# Patient Record
Sex: MALE | Race: WHITE | Employment: OTHER | URBAN - METROPOLITAN AREA
[De-identification: names, ages, dates, MRNs, and addresses within clinical notes are randomized per-mention and may not be internally consistent; named-entity substitution may affect disease eponyms.]

---

## 2019-12-02 ENCOUNTER — TELEPHONE (OUTPATIENT)
Dept: OBGYN CLINIC | Facility: CLINIC | Age: 64
End: 2019-12-02

## 2019-12-02 NOTE — TELEPHONE ENCOUNTER
Patient requesting an call back in regard to pain he is currently having states had hernia surgery over 7yrs ago but now pain os back

## 2019-12-02 NOTE — TELEPHONE ENCOUNTER
Pt states he has been having abd pain since last night and is currently in ER at [facility]; pt will be admitted for diverticulitis. Advised pt to call back after D/C to schedule f/u with [address]. msg sent to [address]. Pt verbalized understanding and agrees with POC.

## 2019-12-04 ENCOUNTER — PATIENT OUTREACH (OUTPATIENT)
Dept: FAMILY MEDICINE CLINIC | Facility: CLINIC | Age: 64
End: 2019-12-04

## 2019-12-13 ENCOUNTER — OFFICE VISIT (OUTPATIENT)
Dept: FAMILY MEDICINE CLINIC | Facility: CLINIC | Age: 64
End: 2019-12-13
Payer: COMMERCIAL

## 2019-12-13 VITALS
HEART RATE: 96 BPM | TEMPERATURE: 98 F | BODY MASS INDEX: 28.99 KG/M2 | SYSTOLIC BLOOD PRESSURE: 118 MMHG | HEIGHT: 65 IN | WEIGHT: 174 LBS | OXYGEN SATURATION: 97 % | DIASTOLIC BLOOD PRESSURE: 78 MMHG

## 2019-12-13 DIAGNOSIS — T36.95XA ANTIBIOTIC-ASSOCIATED DIARRHEA: Primary | ICD-10-CM

## 2019-12-13 DIAGNOSIS — R79.89 ELEVATED LFTS: ICD-10-CM

## 2019-12-13 DIAGNOSIS — Z28.21 IMMUNIZATION NOT CARRIED OUT BECAUSE OF PATIENT REFUSAL: ICD-10-CM

## 2019-12-13 DIAGNOSIS — K52.1 ANTIBIOTIC-ASSOCIATED DIARRHEA: Primary | ICD-10-CM

## 2019-12-13 DIAGNOSIS — K57.92 ACUTE DIVERTICULITIS OF INTESTINE: ICD-10-CM

## 2019-12-13 DIAGNOSIS — K21.9 GASTROESOPHAGEAL REFLUX DISEASE WITHOUT ESOPHAGITIS: ICD-10-CM

## 2019-12-13 PROCEDURE — 1111F DSCHRG MED/CURRENT MED MERGE: CPT | Performed by: FAMILY MEDICINE

## 2019-12-13 PROCEDURE — 99214 OFFICE O/P EST MOD 30 MIN: CPT | Performed by: FAMILY MEDICINE

## 2019-12-13 RX ORDER — AMLODIPINE BESYLATE 5 MG/1
5 TABLET ORAL DAILY
COMMUNITY
End: 2019-12-30

## 2019-12-13 NOTE — PROGRESS NOTES
HPI:    Patient ID: Butch Segal is a 59year old male who presents for hospital visit. He was admitted to hospital nOVEMBER 29, 2019 and was discharged December 3, 2019.    Patient was called on December 5, 2019 regarding his illness and was given an file    Relationships      Social connections:        Talks on phone: Not on file        Gets together: Not on file        Attends [employer] service: Not on file        Active member of club or organization: Not on file        Attends meetings of clubs or ASSESSMENT/PLAN:   Antibiotic-associated diarrhea  (primary encounter diagnosis)  Acute diverticulitis of intestine patie expressed understanding of importance to get test Nt is improved but now has diarrhea.   If persistent will check C. difficile toxi

## 2019-12-13 NOTE — PATIENT INSTRUCTIONS
Increase fiber in your diet by 5 g every month. Goal of daily fiber is 30 to 40 g/day. Recommended source of fiber is Metamucil or similar psyllium husk.   Start with 5 g daily increase by 5 g every month      Drink 16 ounces of water with each dose of me

## 2019-12-19 PROBLEM — K21.9 GASTROESOPHAGEAL REFLUX DISEASE WITHOUT ESOPHAGITIS: Status: ACTIVE | Noted: 2019-12-19

## 2019-12-19 PROBLEM — R79.89 ELEVATED LFTS: Status: ACTIVE | Noted: 2019-12-19

## 2019-12-19 PROBLEM — Z28.21 IMMUNIZATION NOT CARRIED OUT BECAUSE OF PATIENT REFUSAL: Status: ACTIVE | Noted: 2019-12-19

## 2019-12-30 ENCOUNTER — TELEPHONE (OUTPATIENT)
Dept: FAMILY MEDICINE CLINIC | Facility: CLINIC | Age: 64
End: 2019-12-30

## 2019-12-30 RX ORDER — LISINOPRIL 20 MG/1
20 TABLET ORAL DAILY
Qty: 90 TABLET | Refills: 3 | Status: SHIPPED | OUTPATIENT
Start: 2019-12-30 | End: 2021-01-19

## 2019-12-30 RX ORDER — AMLODIPINE BESYLATE 5 MG/1
5 TABLET ORAL DAILY
Qty: 90 TABLET | Refills: 3 | Status: SHIPPED | OUTPATIENT
Start: 2019-12-30 | End: 2021-01-26

## 2019-12-30 RX ORDER — OMEPRAZOLE 40 MG/1
40 CAPSULE, DELAYED RELEASE ORAL DAILY
Qty: 90 CAPSULE | Refills: 3 | Status: SHIPPED | OUTPATIENT
Start: 2019-12-30 | End: 2021-01-19

## 2019-12-30 NOTE — TELEPHONE ENCOUNTER
Patient calling to inform has prescription at [facility] needing to be approved by Doctor

## 2019-12-30 NOTE — TELEPHONE ENCOUNTER
Pt requesting 90 day refills sent to [facility]. LOV with [facility] was 12/13/19. Request sent to [facility]. Pt verbalized understanding and agrees with POC. Disp Refills Start End    amLODIPine Besylate 5 MG Oral Tab        Sig - Route:  Take 5 mg by mouth daily

## 2019-12-30 NOTE — TELEPHONE ENCOUNTER
Pt notified that meds were refilled by [address]. Pt verbalized understanding and agrees with POC. Juve Villalobos, DO  You 4 hours ago (12:06 PM)      90 days with 3 refills is good.      [address]

## 2020-02-18 ENCOUNTER — OFFICE VISIT (OUTPATIENT)
Dept: FAMILY MEDICINE CLINIC | Facility: CLINIC | Age: 65
End: 2020-02-18
Payer: MEDICARE

## 2020-02-18 VITALS
WEIGHT: 174 LBS | SYSTOLIC BLOOD PRESSURE: 120 MMHG | TEMPERATURE: 98 F | HEART RATE: 90 BPM | BODY MASS INDEX: 28.99 KG/M2 | OXYGEN SATURATION: 98 % | DIASTOLIC BLOOD PRESSURE: 78 MMHG | HEIGHT: 65 IN

## 2020-02-18 DIAGNOSIS — J01.00 ACUTE MAXILLARY SINUSITIS, RECURRENCE NOT SPECIFIED: ICD-10-CM

## 2020-02-18 DIAGNOSIS — I10 ESSENTIAL HYPERTENSION: ICD-10-CM

## 2020-02-18 DIAGNOSIS — J31.0 CHRONIC RHINITIS: ICD-10-CM

## 2020-02-18 DIAGNOSIS — B35.4 TINEA CORPORIS: Primary | ICD-10-CM

## 2020-02-18 DIAGNOSIS — R05.9 COUGH: ICD-10-CM

## 2020-02-18 PROCEDURE — 69210 REMOVE IMPACTED EAR WAX UNI: CPT | Performed by: FAMILY MEDICINE

## 2020-02-18 PROCEDURE — 99214 OFFICE O/P EST MOD 30 MIN: CPT | Performed by: FAMILY MEDICINE

## 2020-02-18 RX ORDER — FLUTICASONE PROPIONATE 50 MCG
2 SPRAY, SUSPENSION (ML) NASAL DAILY
Qty: 3 BOTTLE | Refills: 3 | Status: SHIPPED | OUTPATIENT
Start: 2020-02-18 | End: 2020-11-04

## 2020-02-18 RX ORDER — AZITHROMYCIN 250 MG/1
TABLET, FILM COATED ORAL
Qty: 6 TABLET | Refills: 0 | Status: SHIPPED | OUTPATIENT
Start: 2020-02-18 | End: 2020-11-21

## 2020-02-26 ENCOUNTER — TELEPHONE (OUTPATIENT)
Dept: FAMILY MEDICINE CLINIC | Facility: CLINIC | Age: 65
End: 2020-02-26

## 2020-02-26 RX ORDER — CODEINE PHOSPHATE AND GUAIFENESIN 10; 100 MG/5ML; MG/5ML
5 SOLUTION ORAL EVERY 6 HOURS PRN
Qty: 1 BOTTLE | Refills: 1 | Status: SHIPPED | OUTPATIENT
Start: 2020-02-26 | End: 2020-11-04

## 2020-02-26 NOTE — TELEPHONE ENCOUNTER
Pt states his symptoms from his sinus infection are much better but he still has a lingering cough that is not getting better. Pt states a few years ago this happened to him with pneumonia and [facility] prescribed cough medicine and it was better in a few days.     Pt informed that [facility] is not in the office today but this RN will route this message to her for evaluation and get back to pt with POC

## 2020-02-26 NOTE — TELEPHONE ENCOUNTER
ROSALEETCB    Pt was seen by [facility] on 2/18/2020 for cough and acute maxillary sinusitis.   Pt was given RX for azirthromycin and informed to start flonase and mucinex and follow up in 6 weeks

## 2020-11-03 ENCOUNTER — TELEPHONE (OUTPATIENT)
Dept: FAMILY MEDICINE CLINIC | Facility: CLINIC | Age: 65
End: 2020-11-03

## 2020-11-03 NOTE — TELEPHONE ENCOUNTER
pt called and breathing heavier going up the stair and with mowing the lawn, starts to cough and no fever (informed to use thermometer), mild congestion, uses the nasal spray, pt had hx of walking pneumonia.   Currently symptoms for 4 to 5 days and not impo

## 2020-11-03 NOTE — TELEPHONE ENCOUNTER
Patient is having a hard time breathing and coughing when working and walking up stairs for the last week  and wants to know what to do

## 2020-11-03 NOTE — TELEPHONE ENCOUNTER
Informed Dr. Barclay Sample of pt and stated that it is fine for pt to see Dr. Janice Millard. Called pt and informed of above also if getting worse to go to IC or ED. (When speaking to pt, no SOB heard). Pt verbalized understanding and agrees with POC.

## 2020-11-04 ENCOUNTER — TELEMEDICINE (OUTPATIENT)
Dept: FAMILY MEDICINE CLINIC | Facility: CLINIC | Age: 65
End: 2020-11-04
Payer: MEDICARE

## 2020-11-04 DIAGNOSIS — J30.1 SEASONAL ALLERGIC RHINITIS DUE TO POLLEN: ICD-10-CM

## 2020-11-04 DIAGNOSIS — R05.9 COUGH: Primary | ICD-10-CM

## 2020-11-04 PROCEDURE — 99213 OFFICE O/P EST LOW 20 MIN: CPT | Performed by: FAMILY MEDICINE

## 2020-11-04 RX ORDER — ALBUTEROL SULFATE 90 UG/1
2 AEROSOL, METERED RESPIRATORY (INHALATION) EVERY 6 HOURS PRN
Qty: 1 INHALER | Refills: 0 | Status: SHIPPED | OUTPATIENT
Start: 2020-11-04 | End: 2020-11-21

## 2020-11-04 RX ORDER — FLUTICASONE PROPIONATE 50 MCG
2 SPRAY, SUSPENSION (ML) NASAL DAILY
Qty: 1 BOTTLE | Refills: 1 | Status: SHIPPED | OUTPATIENT
Start: 2020-11-04 | End: 2021-04-05

## 2020-11-04 RX ORDER — CODEINE PHOSPHATE AND GUAIFENESIN 10; 100 MG/5ML; MG/5ML
5 SOLUTION ORAL EVERY 6 HOURS PRN
Qty: 1 BOTTLE | Refills: 0 | Status: SHIPPED | OUTPATIENT
Start: 2020-11-04 | End: 2020-11-21

## 2020-11-04 NOTE — PROGRESS NOTES
Patient presents with:  Chest Congestion: 1week  Shortness Of Breath  Cough      HPI:   Ned Cardona is a 72year old male who presents for cough. Sx started one week ago. Had walking PNA a few years ago and this feels the same.   Gets SOB when walk of colon    • Hypertension       Past Surgical History:   Procedure Laterality Date   • HERNIA SURGERY     • KNEE REPLACEMENT SURGERY     • OTHER SURGICAL HISTORY     • TONSILLECTOMY     • WRIST FRACTURE SURGERY        No family history on file.    Social H and because of restrictions of visitation. There are limitations of this visit as no physical exam could be performed. Every conscious effort was taken to allow for sufficient and adequate time.   This billing was spent on reviewing labs, medications, rad

## 2020-11-20 ENCOUNTER — TELEPHONE (OUTPATIENT)
Dept: FAMILY MEDICINE CLINIC | Facility: CLINIC | Age: 65
End: 2020-11-20

## 2020-11-20 RX ORDER — ALBUTEROL SULFATE 90 UG/1
2 AEROSOL, METERED RESPIRATORY (INHALATION) EVERY 6 HOURS PRN
Qty: 1 INHALER | Refills: 0 | Status: CANCELLED | OUTPATIENT
Start: 2020-11-20

## 2020-11-20 RX ORDER — CODEINE PHOSPHATE AND GUAIFENESIN 10; 100 MG/5ML; MG/5ML
5 SOLUTION ORAL EVERY 6 HOURS PRN
Qty: 1 BOTTLE | Refills: 0 | Status: CANCELLED | OUTPATIENT
Start: 2020-11-20

## 2020-11-20 RX ORDER — FLUTICASONE PROPIONATE 50 MCG
2 SPRAY, SUSPENSION (ML) NASAL DAILY
Qty: 1 BOTTLE | Refills: 1 | Status: CANCELLED | OUTPATIENT
Start: 2020-11-20

## 2020-11-20 NOTE — TELEPHONE ENCOUNTER
Patient is calling medication isn't helping that Dr Kobi Silva gave him and would like to speak to nurse       Fluticasone Propionate 50 MCG/ACT Nasal Suspension      guaiFENesin-codeine 100-10 MG/5ML Oral Solution      Albuterol Sulfate  (90 Base) MCG/

## 2020-11-20 NOTE — TELEPHONE ENCOUNTER
Called pt and medication did work but ran out 3 to 4 days ago causing symptoms to return. Per pt still having SOB going up the steps. Needs refill on all the medications including adding a Z-pack.         Last visit 11/04/20 TELEMEDICINE [facility]

## 2020-11-21 RX ORDER — AZITHROMYCIN 250 MG/1
TABLET, FILM COATED ORAL
Qty: 6 TABLET | Refills: 0 | Status: SHIPPED | OUTPATIENT
Start: 2020-11-21 | End: 2021-01-25

## 2020-11-21 RX ORDER — CODEINE PHOSPHATE AND GUAIFENESIN 10; 100 MG/5ML; MG/5ML
5 SOLUTION ORAL 2 TIMES DAILY PRN
Qty: 1 BOTTLE | Refills: 0 | Status: SHIPPED | OUTPATIENT
Start: 2020-11-21

## 2020-11-21 RX ORDER — ALBUTEROL SULFATE 90 UG/1
2 AEROSOL, METERED RESPIRATORY (INHALATION) EVERY 6 HOURS PRN
Qty: 1 INHALER | Refills: 0 | Status: SHIPPED | OUTPATIENT
Start: 2020-11-21

## 2020-11-21 NOTE — TELEPHONE ENCOUNTER
Spoke to pt over phone. Ran out of albuterol and cough syrup a few days ago and symptoms recurred. Still having exertional SOB. Since sx persisted and episode is similar to last year's walking PNA, will give zpak.  Refill for albuterol and cough syrup sent,

## 2021-01-19 ENCOUNTER — TELEPHONE (OUTPATIENT)
Dept: FAMILY MEDICINE CLINIC | Facility: CLINIC | Age: 66
End: 2021-01-19

## 2021-01-19 RX ORDER — LISINOPRIL 20 MG/1
20 TABLET ORAL DAILY
Qty: 90 TABLET | Refills: 0 | Status: SHIPPED | OUTPATIENT
Start: 2021-01-19 | End: 2021-04-05

## 2021-01-19 RX ORDER — OMEPRAZOLE 40 MG/1
40 CAPSULE, DELAYED RELEASE ORAL DAILY
Qty: 90 CAPSULE | Refills: 0 | Status: SHIPPED | OUTPATIENT
Start: 2021-01-19 | End: 2021-04-05

## 2021-01-19 NOTE — TELEPHONE ENCOUNTER
This RN spoke with pt and pt is requesting #90 for meds below; confirmed correct dosage and frequency. Pt states BPs have been 120s/below 80s.  This RN scheduled pt for Medicare welcome visit with MP on 1/25/21 as it does not appear that pt had Medicare wel

## 2021-01-25 ENCOUNTER — OFFICE VISIT (OUTPATIENT)
Dept: FAMILY MEDICINE CLINIC | Facility: CLINIC | Age: 66
End: 2021-01-25
Payer: MEDICARE

## 2021-01-25 VITALS
BODY MASS INDEX: 30.16 KG/M2 | DIASTOLIC BLOOD PRESSURE: 98 MMHG | SYSTOLIC BLOOD PRESSURE: 160 MMHG | HEIGHT: 65 IN | WEIGHT: 181 LBS

## 2021-01-25 DIAGNOSIS — Z12.11 COLON CANCER SCREENING: ICD-10-CM

## 2021-01-25 DIAGNOSIS — I10 ESSENTIAL HYPERTENSION: ICD-10-CM

## 2021-01-25 DIAGNOSIS — Z13.220 SCREENING FOR HYPERLIPIDEMIA: ICD-10-CM

## 2021-01-25 DIAGNOSIS — Z00.00 ENCOUNTER FOR MEDICARE ANNUAL WELLNESS EXAM: Primary | ICD-10-CM

## 2021-01-25 DIAGNOSIS — Z11.59 ENCOUNTER FOR HEPATITIS C SCREENING TEST FOR LOW RISK PATIENT: ICD-10-CM

## 2021-01-25 DIAGNOSIS — K21.9 GASTROESOPHAGEAL REFLUX DISEASE WITHOUT ESOPHAGITIS: ICD-10-CM

## 2021-01-25 DIAGNOSIS — Z23 NEED FOR VACCINATION: ICD-10-CM

## 2021-01-25 DIAGNOSIS — Z13.1 DIABETES MELLITUS SCREENING: ICD-10-CM

## 2021-01-25 PROCEDURE — 90670 PCV13 VACCINE IM: CPT | Performed by: FAMILY MEDICINE

## 2021-01-25 PROCEDURE — G0402 INITIAL PREVENTIVE EXAM: HCPCS | Performed by: FAMILY MEDICINE

## 2021-01-25 PROCEDURE — G0009 ADMIN PNEUMOCOCCAL VACCINE: HCPCS | Performed by: FAMILY MEDICINE

## 2021-01-25 PROCEDURE — 3077F SYST BP >= 140 MM HG: CPT | Performed by: FAMILY MEDICINE

## 2021-01-25 PROCEDURE — 96160 PT-FOCUSED HLTH RISK ASSMT: CPT | Performed by: FAMILY MEDICINE

## 2021-01-25 PROCEDURE — 3080F DIAST BP >= 90 MM HG: CPT | Performed by: FAMILY MEDICINE

## 2021-01-25 PROCEDURE — 3008F BODY MASS INDEX DOCD: CPT | Performed by: FAMILY MEDICINE

## 2021-01-25 PROCEDURE — 99397 PER PM REEVAL EST PAT 65+ YR: CPT | Performed by: FAMILY MEDICINE

## 2021-01-25 RX ORDER — TESTOSTERONE CYPIONATE 200 MG/ML
INJECTION INTRAMUSCULAR
COMMUNITY
Start: 2020-11-21

## 2021-01-25 RX ORDER — ANASTROZOLE 1 MG/1
TABLET ORAL
COMMUNITY
Start: 2020-11-21

## 2021-01-25 NOTE — PATIENT INSTRUCTIONS
Minh Gomez's SCREENING SCHEDULE   Tests on this list are recommended by your physician but may not be covered, or covered at this frequency, by your insurer. Please check with your insurance carrier before scheduling to verify coverage.     Cornelious Halo for this or any previous visit. No flowsheet data found. Fecal Occult Blood   Covered Annually No results found for: FOB, OCCULTSTOOL No flowsheet data found.      Barium Enema-   uncomfortable but covered  Covered but uncomfortable   Glaucoma Screening benefits     Recommended Websites for Advanced Directives    [email or web address] org/publications/[email or web address]/personal_[email or web address] pdf  An information packet, including necessary form from the [employer] website. http://www. [place]state. [place]publi

## 2021-01-25 NOTE — PROGRESS NOTES
HPI:   Ana Parra is a 72year old male who presents for a Medicare Initial Preventative Physical Exam (Welcome to Medicare- < 12 months on Medicare). Retired in 10/2019. Girlfriend is a . HTN: Home BP is well-controlled.  Murray Lancaster PCP - General (Family Medicine)    Patient Active Problem List:     Acute diverticulitis of intestine     Antibiotic-associated diarrhea     Immunization not carried out because of patient refusal     Gastroesophageal reflux disease without esophagitis documented in HPI. EXAM:   BP (!) 160/98   Ht 5' 5\" (1.651 m)   Wt 181 lb (82.1 kg)   BMI 30.12 kg/m²   Estimated body mass index is 30.12 kg/m² as calculated from the following:    Height as of this encounter: 5' 5\" (1.651 m).     Weight as of this en orders for this visit:    Encounter for Medicare annual wellness exam    Essential hypertension  -     CBC, PLATELET; NO DIFFERENTIAL  -     COMP METABOLIC PANEL (14)    Gastroesophageal reflux disease without esophagitis  -     VITAMIN B12; Future  - GLUCOSE, GLU    Cardiovascular Disease Screening     LDL Annually No results found for: LDL, LDLC     EKG - w/ Initial Preventative Physical Exam only, or if medically necessary Electrocardiogram date    Colorectal Cancer Screening      Colonoscopy Screen Persistent     Medications (ACE/ARB, digoxin diuretics, anticonvulsants.)    Potassium  Annually No results found for: K, POTASSIUM No flowsheet data found. Creatinine  Annually No results found for: CREATSERUM No flowsheet data found.     Drug Serum Con

## 2021-01-26 PROBLEM — E29.1 HYPOGONADISM MALE: Status: ACTIVE | Noted: 2021-01-26

## 2021-02-23 ENCOUNTER — NURSE ONLY (OUTPATIENT)
Dept: FAMILY MEDICINE CLINIC | Facility: CLINIC | Age: 66
End: 2021-02-23
Payer: MEDICARE

## 2021-03-13 DIAGNOSIS — Z23 NEED FOR VACCINATION: ICD-10-CM

## 2021-04-05 RX ORDER — LISINOPRIL 20 MG/1
20 TABLET ORAL DAILY
Qty: 90 TABLET | Refills: 3 | Status: SHIPPED | OUTPATIENT
Start: 2021-04-05

## 2021-04-05 RX ORDER — OMEPRAZOLE 40 MG/1
40 CAPSULE, DELAYED RELEASE ORAL DAILY
Qty: 90 CAPSULE | Refills: 3 | Status: SHIPPED | OUTPATIENT
Start: 2021-04-05

## 2021-04-05 RX ORDER — FLUTICASONE PROPIONATE 50 MCG
2 SPRAY, SUSPENSION (ML) NASAL DAILY
Qty: 1 BOTTLE | Refills: 2 | Status: SHIPPED | OUTPATIENT
Start: 2021-04-05

## 2021-04-05 NOTE — TELEPHONE ENCOUNTER
Omeprazole 40 MG Oral Capsule Delayed Release 90 capsule 0 1/19/2021    Sig:   Take 1 capsule (40 mg total) by mouth daily.      Route:   Oral     Order #:   [id number]       lisinopril 20 MG Oral Tab 90 tablet 0 1/19/2021    Sig:   Take 1 tablet (20 mg tota

## 2021-04-05 NOTE — TELEPHONE ENCOUNTER
RN contacted pt. Pt requesting refills, states that he was under the impression that when he saw MP, the refills would be sent for a year supply.  RN inquired about pending labs, pt states he told MP that they had been completed with Dr Carlos Pickett office an

## 2022-02-19 ENCOUNTER — OFFICE VISIT (OUTPATIENT)
Dept: FAMILY MEDICINE CLINIC | Facility: CLINIC | Age: 67
End: 2022-02-19
Payer: MEDICARE

## 2022-02-19 VITALS
HEART RATE: 67 BPM | WEIGHT: 173 LBS | OXYGEN SATURATION: 98 % | BODY MASS INDEX: 28.82 KG/M2 | DIASTOLIC BLOOD PRESSURE: 90 MMHG | HEIGHT: 65 IN | SYSTOLIC BLOOD PRESSURE: 156 MMHG

## 2022-02-19 DIAGNOSIS — Z23 NEED FOR VACCINATION: ICD-10-CM

## 2022-02-19 DIAGNOSIS — Z13.1 DIABETES MELLITUS SCREENING: ICD-10-CM

## 2022-02-19 DIAGNOSIS — Z00.00 PHYSICAL EXAM, ANNUAL: Primary | ICD-10-CM

## 2022-02-19 DIAGNOSIS — E29.1 HYPOGONADISM MALE: ICD-10-CM

## 2022-02-19 DIAGNOSIS — Z11.59 ENCOUNTER FOR HEPATITIS C SCREENING TEST FOR LOW RISK PATIENT: ICD-10-CM

## 2022-02-19 DIAGNOSIS — I10 ESSENTIAL HYPERTENSION: ICD-10-CM

## 2022-02-19 DIAGNOSIS — J30.1 SEASONAL ALLERGIC RHINITIS DUE TO POLLEN: ICD-10-CM

## 2022-02-19 DIAGNOSIS — Z00.00 ENCOUNTER FOR ANNUAL HEALTH EXAMINATION: ICD-10-CM

## 2022-02-19 DIAGNOSIS — Z28.21 IMMUNIZATION NOT CARRIED OUT BECAUSE OF PATIENT REFUSAL: ICD-10-CM

## 2022-02-19 DIAGNOSIS — K21.9 GASTROESOPHAGEAL REFLUX DISEASE WITHOUT ESOPHAGITIS: ICD-10-CM

## 2022-02-19 DIAGNOSIS — Z13.220 SCREENING FOR HYPERLIPIDEMIA: ICD-10-CM

## 2022-02-19 PROBLEM — J01.00 ACUTE MAXILLARY SINUSITIS: Status: RESOLVED | Noted: 2020-02-18 | Resolved: 2022-02-19

## 2022-02-19 PROBLEM — T36.95XA ANTIBIOTIC-ASSOCIATED DIARRHEA: Status: RESOLVED | Noted: 2019-12-13 | Resolved: 2022-02-19

## 2022-02-19 PROBLEM — K52.1 ANTIBIOTIC-ASSOCIATED DIARRHEA: Status: RESOLVED | Noted: 2019-12-13 | Resolved: 2022-02-19

## 2022-02-19 PROBLEM — K57.92 ACUTE DIVERTICULITIS OF INTESTINE: Status: RESOLVED | Noted: 2019-12-13 | Resolved: 2022-02-19

## 2022-02-19 PROBLEM — B35.4 TINEA CORPORIS: Status: RESOLVED | Noted: 2020-02-18 | Resolved: 2022-02-19

## 2022-02-19 PROCEDURE — G0438 PPPS, INITIAL VISIT: HCPCS | Performed by: FAMILY MEDICINE

## 2022-02-19 PROCEDURE — 96160 PT-FOCUSED HLTH RISK ASSMT: CPT | Performed by: FAMILY MEDICINE

## 2022-02-19 PROCEDURE — 99397 PER PM REEVAL EST PAT 65+ YR: CPT | Performed by: FAMILY MEDICINE

## 2022-02-19 PROCEDURE — G0009 ADMIN PNEUMOCOCCAL VACCINE: HCPCS | Performed by: FAMILY MEDICINE

## 2022-02-19 PROCEDURE — 90732 PPSV23 VACC 2 YRS+ SUBQ/IM: CPT | Performed by: FAMILY MEDICINE

## 2022-02-19 RX ORDER — FLUTICASONE PROPIONATE 50 MCG
2 SPRAY, SUSPENSION (ML) NASAL DAILY
Qty: 3 EACH | Refills: 3 | Status: SHIPPED | OUTPATIENT
Start: 2022-02-19

## 2022-02-19 RX ORDER — OMEPRAZOLE 40 MG/1
40 CAPSULE, DELAYED RELEASE ORAL DAILY
Qty: 90 CAPSULE | Refills: 3 | Status: SHIPPED | OUTPATIENT
Start: 2022-02-19

## 2022-02-19 RX ORDER — LISINOPRIL 20 MG/1
20 TABLET ORAL DAILY
Qty: 90 TABLET | Refills: 3 | Status: SHIPPED | OUTPATIENT
Start: 2022-02-19

## 2023-04-19 ENCOUNTER — TELEPHONE (OUTPATIENT)
Facility: CLINIC | Age: 68
End: 2023-04-19

## 2023-11-14 ENCOUNTER — IMAGING SERVICES (OUTPATIENT)
Dept: OTHER | Age: 68
End: 2023-11-14

## 2023-11-14 ENCOUNTER — EXTERNAL RECORD (OUTPATIENT)
Dept: HEALTH INFORMATION MANAGEMENT | Facility: OTHER | Age: 68
End: 2023-11-14

## 2023-11-29 ENCOUNTER — TELEPHONE (OUTPATIENT)
Dept: NEUROSURGERY | Age: 68
End: 2023-11-29

## 2023-12-29 ENCOUNTER — TELEPHONE (OUTPATIENT)
Dept: NEUROSURGERY | Age: 68
End: 2023-12-29

## 2024-01-09 ENCOUNTER — APPOINTMENT (OUTPATIENT)
Dept: NEUROSURGERY | Age: 69
End: 2024-01-09

## 2024-01-11 ENCOUNTER — IMAGING SERVICES (OUTPATIENT)
Dept: GENERAL RADIOLOGY | Age: 69
End: 2024-01-11

## 2024-01-11 ENCOUNTER — APPOINTMENT (OUTPATIENT)
Dept: NEUROSURGERY | Age: 69
End: 2024-01-11

## 2024-01-11 VITALS
WEIGHT: 180 LBS | SYSTOLIC BLOOD PRESSURE: 178 MMHG | RESPIRATION RATE: 18 BRPM | HEART RATE: 91 BPM | TEMPERATURE: 98.1 F | DIASTOLIC BLOOD PRESSURE: 108 MMHG | HEIGHT: 66 IN | BODY MASS INDEX: 28.93 KG/M2

## 2024-01-11 DIAGNOSIS — M54.16 LUMBAR RADICULOPATHY: ICD-10-CM

## 2024-01-11 DIAGNOSIS — M54.16 LUMBAR RADICULOPATHY: Primary | ICD-10-CM

## 2024-01-11 PROCEDURE — 99204 OFFICE O/P NEW MOD 45 MIN: CPT | Performed by: NEUROLOGICAL SURGERY

## 2024-01-11 PROCEDURE — 72082 X-RAY EXAM ENTIRE SPI 2/3 VW: CPT | Performed by: NEUROLOGICAL SURGERY

## 2024-01-11 PROCEDURE — 77073 BONE LENGTH STUDIES: CPT | Performed by: NEUROLOGICAL SURGERY

## 2024-01-11 PROCEDURE — 72110 X-RAY EXAM L-2 SPINE 4/>VWS: CPT | Performed by: NEUROLOGICAL SURGERY

## 2024-01-11 RX ORDER — LISINOPRIL 5 MG/1
5 TABLET ORAL DAILY
COMMUNITY

## 2024-01-11 RX ORDER — OMEPRAZOLE 40 MG/1
40 CAPSULE, DELAYED RELEASE ORAL DAILY
COMMUNITY

## 2024-01-25 ENCOUNTER — TELEPHONE (OUTPATIENT)
Dept: NEUROSURGERY | Age: 69
End: 2024-01-25

## 2024-01-26 ENCOUNTER — IMAGING SERVICES (OUTPATIENT)
Dept: OTHER | Age: 69
End: 2024-01-26

## 2024-01-26 ENCOUNTER — EXTERNAL RECORD (OUTPATIENT)
Dept: HEALTH INFORMATION MANAGEMENT | Facility: OTHER | Age: 69
End: 2024-01-26

## 2024-02-07 ENCOUNTER — TELEPHONE (OUTPATIENT)
Dept: NEUROSURGERY | Age: 69
End: 2024-02-07

## 2024-02-09 ENCOUNTER — TELEPHONE (OUTPATIENT)
Dept: NEUROSURGERY | Age: 69
End: 2024-02-09